# Patient Record
Sex: FEMALE | Race: WHITE | Employment: FULL TIME | URBAN - METROPOLITAN AREA
[De-identification: names, ages, dates, MRNs, and addresses within clinical notes are randomized per-mention and may not be internally consistent; named-entity substitution may affect disease eponyms.]

---

## 2018-02-19 ENCOUNTER — TELEPHONE (OUTPATIENT)
Dept: OBGYN CLINIC | Facility: CLINIC | Age: 33
End: 2018-02-19

## 2018-02-19 DIAGNOSIS — O36.80X0 ENCOUNTER TO DETERMINE FETAL VIABILITY OF PREGNANCY, SINGLE OR UNSPECIFIED FETUS: Primary | ICD-10-CM

## 2018-02-19 NOTE — TELEPHONE ENCOUNTER
----- Message from Oma Adams sent at 2/19/2018 11:39 AM CST -----  Regarding: US order   Needs order for FOB US  On March 21.

## 2018-02-20 ENCOUNTER — TELEPHONE (OUTPATIENT)
Dept: OBGYN CLINIC | Facility: CLINIC | Age: 33
End: 2018-02-20

## 2018-02-20 NOTE — TELEPHONE ENCOUNTER
Spoke with pt. Advised okay to have eye dilated if necessary, fluroscein is category C, but limited exposure with only 1 drop per eye. Pt had severe case of pink eye and was on opthalmic antibiotic eye drops and steroid drops for 2-3 weeks.  Currently 5w0d

## 2018-02-20 NOTE — TELEPHONE ENCOUNTER
Patient is seeing her eye doctor today and wants to know if she can get her eyes dilated - she recently found out she's pregnant. FOB appt:   Future Appointments  Date Time Provider Gisell Dao   3/21/2018 10:15 AM [facility] US [facility] O [facility]

## 2018-03-21 ENCOUNTER — APPOINTMENT (OUTPATIENT)
Dept: OBGYN CLINIC | Facility: CLINIC | Age: 33
End: 2018-03-21

## 2018-03-21 ENCOUNTER — OFFICE VISIT (OUTPATIENT)
Dept: OBGYN CLINIC | Facility: CLINIC | Age: 33
End: 2018-03-21

## 2018-03-21 VITALS
HEIGHT: 63 IN | SYSTOLIC BLOOD PRESSURE: 90 MMHG | WEIGHT: 133 LBS | BODY MASS INDEX: 23.57 KG/M2 | DIASTOLIC BLOOD PRESSURE: 60 MMHG | HEART RATE: 80 BPM

## 2018-03-21 DIAGNOSIS — Z34.81 PRENATAL CARE, SUBSEQUENT PREGNANCY IN FIRST TRIMESTER: Primary | ICD-10-CM

## 2018-03-21 DIAGNOSIS — O36.80X0 ENCOUNTER TO DETERMINE FETAL VIABILITY OF PREGNANCY, SINGLE OR UNSPECIFIED FETUS: ICD-10-CM

## 2018-03-21 DIAGNOSIS — Z36.9 ENCOUNTER FOR ANTENATAL SCREENING OF MOTHER: ICD-10-CM

## 2018-03-21 LAB
APPEARANCE: CLEAR
MULTISTIX LOT#: NORMAL NUMERIC
PH, URINE: 7 (ref 4.5–8)
SPECIFIC GRAVITY: 1.01 (ref 1–1.03)
URINE-COLOR: YELLOW
UROBILINOGEN,SEMI-QN: 0.2 MG/DL (ref 0–1.9)

## 2018-03-21 PROCEDURE — 87491 CHLMYD TRACH DNA AMP PROBE: CPT | Performed by: OBSTETRICS & GYNECOLOGY

## 2018-03-21 PROCEDURE — 76801 OB US < 14 WKS SINGLE FETUS: CPT | Performed by: OBSTETRICS & GYNECOLOGY

## 2018-03-21 PROCEDURE — 87086 URINE CULTURE/COLONY COUNT: CPT | Performed by: OBSTETRICS & GYNECOLOGY

## 2018-03-21 PROCEDURE — 87591 N.GONORRHOEAE DNA AMP PROB: CPT | Performed by: OBSTETRICS & GYNECOLOGY

## 2018-03-21 PROCEDURE — 81002 URINALYSIS NONAUTO W/O SCOPE: CPT | Performed by: OBSTETRICS & GYNECOLOGY

## 2018-03-21 RX ORDER — GLYCERIN/MINERAL OIL
LOTION (ML) TOPICAL
COMMUNITY

## 2018-03-21 NOTE — PROGRESS NOTES
Patient had ultrasound done on 3/21/2018. Gestational age of 10 weeks 4 days. Due date of 2018. By her last menstrual period CHRISTIANO of 2018. Patient declines any genetic testing. She wishes to have a repeat .   Her previous

## 2018-03-22 ENCOUNTER — TELEPHONE (OUTPATIENT)
Dept: OBGYN CLINIC | Facility: CLINIC | Age: 33
End: 2018-03-22

## 2018-03-22 LAB
C TRACH DNA SPEC QL NAA+PROBE: NEGATIVE
N GONORRHOEA DNA SPEC QL NAA+PROBE: NEGATIVE

## 2018-03-22 NOTE — TELEPHONE ENCOUNTER
Questions answered regarding normalcy of pt's ultrasound. Pt was worried because she could not see \"entire baby\" on the ultrasound. Reassured of how small a fetus is at 9 weeks, but everything looks normal and growth is appropriate.

## 2018-04-10 ENCOUNTER — LAB ENCOUNTER (OUTPATIENT)
Dept: LAB | Age: 33
End: 2018-04-10
Attending: OBSTETRICS & GYNECOLOGY
Payer: COMMERCIAL

## 2018-04-10 DIAGNOSIS — Z36.9 ENCOUNTER FOR ANTENATAL SCREENING OF MOTHER: ICD-10-CM

## 2018-04-10 PROCEDURE — 86900 BLOOD TYPING SEROLOGIC ABO: CPT

## 2018-04-10 PROCEDURE — 86901 BLOOD TYPING SEROLOGIC RH(D): CPT

## 2018-04-10 PROCEDURE — 36415 COLL VENOUS BLD VENIPUNCTURE: CPT

## 2018-04-10 PROCEDURE — 87389 HIV-1 AG W/HIV-1&-2 AB AG IA: CPT

## 2018-04-10 PROCEDURE — 86762 RUBELLA ANTIBODY: CPT

## 2018-04-10 PROCEDURE — 87340 HEPATITIS B SURFACE AG IA: CPT

## 2018-04-10 PROCEDURE — 86850 RBC ANTIBODY SCREEN: CPT

## 2018-04-10 PROCEDURE — 85025 COMPLETE CBC W/AUTO DIFF WBC: CPT

## 2018-04-10 PROCEDURE — 86780 TREPONEMA PALLIDUM: CPT

## 2018-04-18 ENCOUNTER — OFFICE VISIT (OUTPATIENT)
Dept: OBGYN CLINIC | Facility: CLINIC | Age: 33
End: 2018-04-18

## 2018-04-18 VITALS
BODY MASS INDEX: 24.63 KG/M2 | DIASTOLIC BLOOD PRESSURE: 62 MMHG | HEIGHT: 63 IN | SYSTOLIC BLOOD PRESSURE: 100 MMHG | WEIGHT: 139 LBS | HEART RATE: 76 BPM

## 2018-04-18 DIAGNOSIS — Z34.82 PRENATAL CARE, SUBSEQUENT PREGNANCY IN SECOND TRIMESTER: Primary | ICD-10-CM

## 2018-05-21 ENCOUNTER — OFFICE VISIT (OUTPATIENT)
Dept: OBGYN CLINIC | Facility: CLINIC | Age: 33
End: 2018-05-21

## 2018-05-21 VITALS
HEIGHT: 63 IN | WEIGHT: 144 LBS | BODY MASS INDEX: 25.52 KG/M2 | HEART RATE: 76 BPM | DIASTOLIC BLOOD PRESSURE: 60 MMHG | SYSTOLIC BLOOD PRESSURE: 104 MMHG

## 2018-05-21 DIAGNOSIS — Z34.82 PRENATAL CARE, SUBSEQUENT PREGNANCY IN SECOND TRIMESTER: Primary | ICD-10-CM

## 2018-05-21 DIAGNOSIS — Z36.89 ENCOUNTER FOR FETAL ANATOMIC SURVEY: ICD-10-CM

## 2018-05-21 NOTE — PROGRESS NOTES
IKER NUNEZ felt almost daily now. Doing well. Discussed use of sunscreen, bug spray with DEET and tick prevention while pregnancy this summer. PT vu.   No complaints. No LOF/VB/uctx  Genetic testing declined at previous visits and today as well.    Anatomy S

## 2018-06-27 ENCOUNTER — ROUTINE PRENATAL (OUTPATIENT)
Dept: OBGYN CLINIC | Facility: CLINIC | Age: 33
End: 2018-06-27

## 2018-06-27 VITALS
SYSTOLIC BLOOD PRESSURE: 114 MMHG | BODY MASS INDEX: 26.75 KG/M2 | HEART RATE: 70 BPM | HEIGHT: 63 IN | WEIGHT: 151 LBS | DIASTOLIC BLOOD PRESSURE: 68 MMHG

## 2018-06-27 DIAGNOSIS — Z34.02 ENCOUNTER FOR SUPERVISION OF NORMAL FIRST PREGNANCY IN SECOND TRIMESTER: ICD-10-CM

## 2018-06-27 DIAGNOSIS — Z36.9 ANTENATAL SCREENING ENCOUNTER: Primary | ICD-10-CM

## 2018-06-27 NOTE — PROGRESS NOTES
Doing well. Fetus active. Going to [facility].   Aware to use insect repellent 1 hour Glucola return to office in 4 weeks

## 2018-07-09 ENCOUNTER — TELEPHONE (OUTPATIENT)
Dept: OBGYN CLINIC | Facility: CLINIC | Age: 33
End: 2018-07-09

## 2018-07-09 NOTE — TELEPHONE ENCOUNTER
PC with patient. States has nasal stuffiness, a little drainage  and some post nasal drip that irritates her throat. Try saline nasal spray, warm steam and salt water gargles for throat. She can try 1 plain sudafed to help loosen if needed.  Tylenol for pain,

## 2018-07-19 ENCOUNTER — APPOINTMENT (OUTPATIENT)
Dept: LAB | Age: 33
End: 2018-07-19
Attending: NURSE PRACTITIONER
Payer: COMMERCIAL

## 2018-07-19 LAB
BAND %: 6 %
BASOPHIL % MANUAL: 0 %
BASOPHIL ABSOLUTE MANUAL: 0 X10(3) UL (ref 0–0.1)
EOSINOPHIL % MANUAL: 1 %
EOSINOPHIL ABSOLUTE MANUAL: 0.1 X10(3) UL (ref 0–0.3)
ERYTHROCYTE [DISTWIDTH] IN BLOOD BY AUTOMATED COUNT: 14.6 % (ref 11.5–16)
GLUCOSE 1H P GLC SERPL-MCNC: 123 MG/DL
HCT VFR BLD AUTO: 36.6 % (ref 34–50)
HGB BLD-MCNC: 11.7 G/DL (ref 12–16)
LYMPHOCYTE % MANUAL: 3 %
LYMPHOCYTE ABSOLUTE MANUAL: 0.29 X10(3) UL (ref 0.9–4)
MCH RBC QN AUTO: 29.8 PG (ref 27–33.2)
MCHC RBC AUTO-ENTMCNC: 32 G/DL (ref 31–37)
MCV RBC AUTO: 93.1 FL (ref 81–100)
METAMYELOCYTE %: 3 %
METAMYELOCYTE ABSOLUTE MANUAL: 0.29 X10(3) UL (ref ?–0.01)
MONOCYTE % MANUAL: 12 %
MONOCYTE ABSOLUTE MANUAL: 1.14 X10(3) UL (ref 0.1–1)
NEUTROPHIL ABS PRELIM: 7.01 X10 (3) UL (ref 1.3–6.7)
NEUTROPHIL ABSOLUTE MANUAL: 7.7 X10(3) UL (ref 1.3–6.7)
NEUTROPHILS % MANUAL: 75 %
PLATELET # BLD AUTO: 211 10(3)UL (ref 150–450)
PLATELET MORPHOLOGY: NORMAL
RBC # BLD AUTO: 3.93 X10(6)UL (ref 3.8–5.1)
RED CELL DISTRIBUTION WIDTH-SD: 49.5 FL (ref 35.1–46.3)
TOTAL CELLS COUNTED: 100
WBC # BLD AUTO: 9.5 X10(3) UL (ref 4–13)

## 2018-07-19 PROCEDURE — 85027 COMPLETE CBC AUTOMATED: CPT | Performed by: OBSTETRICS & GYNECOLOGY

## 2018-07-19 PROCEDURE — 85007 BL SMEAR W/DIFF WBC COUNT: CPT | Performed by: OBSTETRICS & GYNECOLOGY

## 2018-07-19 PROCEDURE — 36415 COLL VENOUS BLD VENIPUNCTURE: CPT | Performed by: OBSTETRICS & GYNECOLOGY

## 2018-07-19 PROCEDURE — 82950 GLUCOSE TEST: CPT | Performed by: OBSTETRICS & GYNECOLOGY

## 2018-07-19 PROCEDURE — 85025 COMPLETE CBC W/AUTO DIFF WBC: CPT | Performed by: OBSTETRICS & GYNECOLOGY

## 2018-07-25 ENCOUNTER — ROUTINE PRENATAL (OUTPATIENT)
Dept: OBGYN CLINIC | Facility: CLINIC | Age: 33
End: 2018-07-25
Payer: COMMERCIAL

## 2018-07-25 ENCOUNTER — HOSPITAL ENCOUNTER (OUTPATIENT)
Facility: HOSPITAL | Age: 33
Setting detail: OBSERVATION
Discharge: HOME | End: 2018-07-25
Attending: OBSTETRICS & GYNECOLOGY | Admitting: OBSTETRICS & GYNECOLOGY
Payer: COMMERCIAL

## 2018-07-25 ENCOUNTER — APPOINTMENT (OUTPATIENT)
Dept: ULTRASOUND IMAGING | Facility: HOSPITAL | Age: 33
End: 2018-07-25
Attending: OBSTETRICS & GYNECOLOGY
Payer: COMMERCIAL

## 2018-07-25 VITALS
HEART RATE: 80 BPM | RESPIRATION RATE: 22 BRPM | TEMPERATURE: 98 F | HEIGHT: 62.99 IN | SYSTOLIC BLOOD PRESSURE: 123 MMHG | WEIGHT: 157 LBS | DIASTOLIC BLOOD PRESSURE: 69 MMHG | BODY MASS INDEX: 27.82 KG/M2

## 2018-07-25 VITALS
SYSTOLIC BLOOD PRESSURE: 104 MMHG | HEART RATE: 64 BPM | HEIGHT: 63 IN | BODY MASS INDEX: 27.82 KG/M2 | WEIGHT: 157 LBS | DIASTOLIC BLOOD PRESSURE: 68 MMHG

## 2018-07-25 DIAGNOSIS — Z23 ENCOUNTER FOR IMMUNIZATION: ICD-10-CM

## 2018-07-25 DIAGNOSIS — O36.8390 ABNORMALITY IN FETAL HEART RATE OR RHYTHM, ANTEPARTUM: ICD-10-CM

## 2018-07-25 DIAGNOSIS — Z34.02 ENCOUNTER FOR SUPERVISION OF NORMAL FIRST PREGNANCY IN SECOND TRIMESTER: Primary | ICD-10-CM

## 2018-07-25 DIAGNOSIS — Z34.82 PRENATAL CARE, SUBSEQUENT PREGNANCY IN SECOND TRIMESTER: ICD-10-CM

## 2018-07-25 PROBLEM — Z34.90 PREGNANCY: Status: ACTIVE | Noted: 2018-07-25

## 2018-07-25 LAB — FETAL FIBRINECTIN: NEGATIVE

## 2018-07-25 PROCEDURE — 59025 FETAL NON-STRESS TEST: CPT | Performed by: OBSTETRICS & GYNECOLOGY

## 2018-07-25 PROCEDURE — 90471 IMMUNIZATION ADMIN: CPT | Performed by: NURSE PRACTITIONER

## 2018-07-25 PROCEDURE — 90715 TDAP VACCINE 7 YRS/> IM: CPT | Performed by: NURSE PRACTITIONER

## 2018-07-25 PROCEDURE — 76815 OB US LIMITED FETUS(S): CPT | Performed by: OBSTETRICS & GYNECOLOGY

## 2018-07-25 PROCEDURE — 99219 INITIAL OBSERVATION CARE,LEVL II: CPT | Performed by: OBSTETRICS & GYNECOLOGY

## 2018-07-25 RX ORDER — TERBUTALINE SULFATE 1 MG/ML
0.25 INJECTION, SOLUTION SUBCUTANEOUS ONCE
Status: COMPLETED | OUTPATIENT
Start: 2018-07-25 | End: 2018-07-25

## 2018-07-25 NOTE — PROGRESS NOTES
IKER  Doing well  FM GOOD, denies VB/LOF/uctx  1 hr glucose= PASSED  TDAP recommended during pregnancy and instructions given. Pt received immunization today. FHTs audibly varying between 140s & 60-70s. Placed on EFM/NST to obtain FHR tracing.  Baseline kriss

## 2018-07-25 NOTE — PROGRESS NOTES
Pt is a 27year old female admitted to TRG2/TRG2-A, Patient presents with:  Non-stress Test: Extended monitoring     Pt is 27w1d intra-uterine pregnancy. Denies any leaking of fluid. Reports +fetal movement. History obtained, consents signed.  Oriented to

## 2018-07-26 NOTE — NST
Nonstress Test   Patient: Newt Gain    Gestation: 27w1d    NST:       Variability: Moderate           Accelerations: Yes           Decelerations: None            Baseline: 145 BPM           Uterine Irritability: No           Contractions: Not pre

## 2018-08-09 ENCOUNTER — ROUTINE PRENATAL (OUTPATIENT)
Dept: OBGYN CLINIC | Facility: CLINIC | Age: 33
End: 2018-08-09
Payer: COMMERCIAL

## 2018-08-09 VITALS
HEIGHT: 63 IN | DIASTOLIC BLOOD PRESSURE: 68 MMHG | HEART RATE: 68 BPM | SYSTOLIC BLOOD PRESSURE: 116 MMHG | BODY MASS INDEX: 28.53 KG/M2 | WEIGHT: 161 LBS

## 2018-08-09 DIAGNOSIS — Z36.9 ANTENATAL SCREENING ENCOUNTER: ICD-10-CM

## 2018-08-09 DIAGNOSIS — Z36.89 ENCOUNTER FOR ULTRASOUND TO ASSESS INTERVAL GROWTH OF FETUS: ICD-10-CM

## 2018-08-09 DIAGNOSIS — Z34.03 ENCOUNTER FOR SUPERVISION OF NORMAL FIRST PREGNANCY IN THIRD TRIMESTER: Primary | ICD-10-CM

## 2018-08-09 NOTE — PROGRESS NOTES
IKER  Doing well, GOOD FM. Denies VB/LOF/uctx. Pt has stopped consuming caffeine. Reassured of \"normal\" FHTs today. No auditory signs of fetal arrhythmia at this time. Pt understands she is measuring 2 weeks \"ahead\". Will get growth US next week.   RTC

## 2018-08-21 ENCOUNTER — PATIENT MESSAGE (OUTPATIENT)
Dept: OBGYN CLINIC | Facility: CLINIC | Age: 33
End: 2018-08-21

## 2018-08-21 ENCOUNTER — APPOINTMENT (OUTPATIENT)
Dept: LAB | Age: 33
End: 2018-08-21
Attending: NURSE PRACTITIONER
Payer: COMMERCIAL

## 2018-08-21 LAB
BAND %: 2 %
BASOPHIL % MANUAL: 0 %
BASOPHIL ABSOLUTE MANUAL: 0 X10(3) UL (ref 0–0.1)
EOSINOPHIL % MANUAL: 1 %
EOSINOPHIL ABSOLUTE MANUAL: 0.12 X10(3) UL (ref 0–0.3)
ERYTHROCYTE [DISTWIDTH] IN BLOOD BY AUTOMATED COUNT: 14.8 % (ref 11.5–16)
HCT VFR BLD AUTO: 35.8 % (ref 34–50)
HGB BLD-MCNC: 11.7 G/DL (ref 12–16)
LYMPHOCYTE % MANUAL: 12 %
LYMPHOCYTE ABSOLUTE MANUAL: 1.49 X10(3) UL (ref 0.9–4)
MCH RBC QN AUTO: 29.7 PG (ref 27–33.2)
MCHC RBC AUTO-ENTMCNC: 32.7 G/DL (ref 31–37)
MCV RBC AUTO: 90.9 FL (ref 81–100)
METAMYELOCYTE %: 1 %
METAMYELOCYTE ABSOLUTE MANUAL: 0.12 X10(3) UL (ref ?–0.01)
MONOCYTE % MANUAL: 2 %
MONOCYTE ABSOLUTE MANUAL: 0.25 X10(3) UL (ref 0.1–1)
MORPHOLOGY: NORMAL
NEUTROPHIL ABS PRELIM: 9.16 X10 (3) UL (ref 1.3–6.7)
NEUTROPHIL ABSOLUTE MANUAL: 10.42 X10(3) UL (ref 1.3–6.7)
NEUTROPHILS % MANUAL: 82 %
PLATELET # BLD AUTO: 229 10(3)UL (ref 150–450)
PLATELET MORPHOLOGY: NORMAL
RBC # BLD AUTO: 3.94 X10(6)UL (ref 3.8–5.1)
RED CELL DISTRIBUTION WIDTH-SD: 49.1 FL (ref 35.1–46.3)
TOTAL CELLS COUNTED: 100
WBC # BLD AUTO: 12.4 X10(3) UL (ref 4–13)

## 2018-08-21 PROCEDURE — 85007 BL SMEAR W/DIFF WBC COUNT: CPT | Performed by: NURSE PRACTITIONER

## 2018-08-21 PROCEDURE — 87389 HIV-1 AG W/HIV-1&-2 AB AG IA: CPT | Performed by: NURSE PRACTITIONER

## 2018-08-21 PROCEDURE — 85025 COMPLETE CBC W/AUTO DIFF WBC: CPT | Performed by: NURSE PRACTITIONER

## 2018-08-21 PROCEDURE — 85027 COMPLETE CBC AUTOMATED: CPT | Performed by: NURSE PRACTITIONER

## 2018-08-21 PROCEDURE — 36415 COLL VENOUS BLD VENIPUNCTURE: CPT | Performed by: NURSE PRACTITIONER

## 2018-08-21 NOTE — TELEPHONE ENCOUNTER
From: Italia Levine  To: CHRISTIAN Varela  Sent: 8/21/2018 8:14 AM CDT  Subject: Non-Urgent Medical Question    Hi,    I went today to get my blood work done.  They umm blood for the HIV test and TBC test but they didn't show that I was supposed

## 2018-08-27 ENCOUNTER — ROUTINE PRENATAL (OUTPATIENT)
Dept: OBGYN CLINIC | Facility: CLINIC | Age: 33
End: 2018-08-27
Payer: COMMERCIAL

## 2018-08-27 ENCOUNTER — TELEPHONE (OUTPATIENT)
Dept: OBGYN CLINIC | Facility: CLINIC | Age: 33
End: 2018-08-27

## 2018-08-27 VITALS
DIASTOLIC BLOOD PRESSURE: 70 MMHG | HEIGHT: 63 IN | HEART RATE: 68 BPM | SYSTOLIC BLOOD PRESSURE: 112 MMHG | BODY MASS INDEX: 28.88 KG/M2 | WEIGHT: 163 LBS

## 2018-08-27 DIAGNOSIS — Z34.03 ENCOUNTER FOR SUPERVISION OF NORMAL FIRST PREGNANCY IN THIRD TRIMESTER: Primary | ICD-10-CM

## 2018-08-27 NOTE — PROGRESS NOTES
IKER. (US was cancelled for today)  Doing well, GOOD FM.  Denies VB/LOF/uctx  RTC in 9 days for IKER and growth US  Fetal movement/kick counts instructions given

## 2018-09-05 ENCOUNTER — ROUTINE PRENATAL (OUTPATIENT)
Dept: OBGYN CLINIC | Facility: CLINIC | Age: 33
End: 2018-09-05
Payer: COMMERCIAL

## 2018-09-05 ENCOUNTER — APPOINTMENT (OUTPATIENT)
Dept: OBGYN CLINIC | Facility: CLINIC | Age: 33
End: 2018-09-05
Payer: COMMERCIAL

## 2018-09-05 VITALS
BODY MASS INDEX: 29.06 KG/M2 | SYSTOLIC BLOOD PRESSURE: 108 MMHG | DIASTOLIC BLOOD PRESSURE: 70 MMHG | HEIGHT: 63 IN | HEART RATE: 76 BPM | WEIGHT: 164 LBS

## 2018-09-05 DIAGNOSIS — Z34.83 ENCOUNTER FOR SUPERVISION OF OTHER NORMAL PREGNANCY IN THIRD TRIMESTER: Primary | ICD-10-CM

## 2018-09-05 DIAGNOSIS — Z36.89 ENCOUNTER FOR ULTRASOUND TO ASSESS FETAL GROWTH: ICD-10-CM

## 2018-09-05 DIAGNOSIS — O40.9XX0 POLYHYDRAMNIOS, ANTEPARTUM, SINGLE OR UNSPECIFIED FETUS: ICD-10-CM

## 2018-09-05 NOTE — PROGRESS NOTES
Doing well, GOOD FM. Denies VB/LOF/uctx  Pt had growth US today and found to have polyhydramnios at 26.65 cm. S=D (EFW 2322 gm). Discussed with pt will have her follow up with MFM in 1-2 weeks to have Level II US done and consult.  Phone number for their of

## 2018-09-13 ENCOUNTER — TELEPHONE (OUTPATIENT)
Dept: OBGYN CLINIC | Facility: CLINIC | Age: 33
End: 2018-09-13

## 2018-09-13 NOTE — TELEPHONE ENCOUNTER
[facility]     no authorization required if its a 5 day stay, must call if a longer stay is required   Ref# [id number]

## 2018-09-18 NOTE — PROGRESS NOTES
Outpatient Maternal-Fetal Medicine Consultation    Dear Dr. Dmitriy Bautista,    Thank you for requesting ultrasound evaluation and maternal fetal medicine consultation on your patient Chayo Mary.   As you are aware she is a 26year old female with a [place] patient had a level II ultrasound today which I interpreted the results and reviewed them with the patient. Indication: poly. Maternal age (26 years).    ____________________________________________________________________________  History: Age: 26 years The fetal anatomy survey appears normal and fetal growth is appropriate. Polyhydramnios    ____________________________________________________________________________  Fetal Wellbeing Assessment:  Amniotic fluid: polyhydramnios. BOZENA: 29.4 cm.  MVP: 11.9 cm surveillance is advised for these patients including a comprehensive ultrasound to assess for fetal malformations (at 20 weeks) and a third trimester ultrasound assessment for fetal growth (at 32 weeks).  In addition, weekly NST's (initiating at 36 weeks ge testing in older women does increase the chance that a women will be induced (71 inductions per fetal death averted) and thereby increases her risk of having a  delivery, only 14 additional cesareans would need to be performed to avert one unexplai cm  · Advanced maternal age, declined genetic testing or screening previously  · H/o     RECOMMENDATIONS:  · Continue care with Dr. Fritz Vasquez  · Weekly NST with BOZENA until delivery  · Delivery is recommended at 38-39 weeks for polyhydramnios    Thank yo

## 2018-09-19 ENCOUNTER — ROUTINE PRENATAL (OUTPATIENT)
Dept: OBGYN CLINIC | Facility: CLINIC | Age: 33
End: 2018-09-19
Payer: COMMERCIAL

## 2018-09-19 ENCOUNTER — OFFICE VISIT (OUTPATIENT)
Dept: PERINATAL CARE | Facility: HOSPITAL | Age: 33
End: 2018-09-19
Attending: NURSE PRACTITIONER
Payer: COMMERCIAL

## 2018-09-19 VITALS
SYSTOLIC BLOOD PRESSURE: 104 MMHG | DIASTOLIC BLOOD PRESSURE: 68 MMHG | HEART RATE: 84 BPM | WEIGHT: 165 LBS | HEIGHT: 63 IN | BODY MASS INDEX: 29.23 KG/M2

## 2018-09-19 VITALS
DIASTOLIC BLOOD PRESSURE: 69 MMHG | SYSTOLIC BLOOD PRESSURE: 128 MMHG | HEART RATE: 96 BPM | HEIGHT: 63 IN | WEIGHT: 166 LBS | RESPIRATION RATE: 20 BRPM | BODY MASS INDEX: 29.41 KG/M2

## 2018-09-19 DIAGNOSIS — O40.9XX0 POLYHYDRAMNIOS, ANTEPARTUM, SINGLE OR UNSPECIFIED FETUS: ICD-10-CM

## 2018-09-19 DIAGNOSIS — Z36.85 ANTENATAL SCREENING FOR STREPTOCOCCUS B: ICD-10-CM

## 2018-09-19 DIAGNOSIS — O09.523 ELDERLY MULTIGRAVIDA IN THIRD TRIMESTER: ICD-10-CM

## 2018-09-19 DIAGNOSIS — Z34.83 ENCOUNTER FOR SUPERVISION OF OTHER NORMAL PREGNANCY IN THIRD TRIMESTER: Primary | ICD-10-CM

## 2018-09-19 PROCEDURE — 87081 CULTURE SCREEN ONLY: CPT | Performed by: NURSE PRACTITIONER

## 2018-09-19 PROCEDURE — 76820 UMBILICAL ARTERY ECHO: CPT | Performed by: OBSTETRICS & GYNECOLOGY

## 2018-09-19 PROCEDURE — 76819 FETAL BIOPHYS PROFIL W/O NST: CPT | Performed by: OBSTETRICS & GYNECOLOGY

## 2018-09-19 PROCEDURE — 87653 STREP B DNA AMP PROBE: CPT | Performed by: NURSE PRACTITIONER

## 2018-09-19 PROCEDURE — 99243 OFF/OP CNSLTJ NEW/EST LOW 30: CPT | Performed by: OBSTETRICS & GYNECOLOGY

## 2018-09-19 PROCEDURE — 76811 OB US DETAILED SNGL FETUS: CPT | Performed by: OBSTETRICS & GYNECOLOGY

## 2018-09-19 NOTE — PROGRESS NOTES
IKER  Doing well, +FM. Denies VB/LOF/uctx. C/s scheduled on 10-13-18 at 39 weeks gestation  Labor precautions discussed  GBS collected  RTC 1 week  Seeing MFM right after this appointment. They will be doing a level 2 US and consult on her polyhydramnios.

## 2018-09-24 ENCOUNTER — TELEPHONE (OUTPATIENT)
Dept: OBGYN CLINIC | Facility: CLINIC | Age: 33
End: 2018-09-24

## 2018-09-27 ENCOUNTER — APPOINTMENT (OUTPATIENT)
Dept: ULTRASOUND IMAGING | Facility: HOSPITAL | Age: 33
End: 2018-09-27
Attending: OBSTETRICS & GYNECOLOGY
Payer: COMMERCIAL

## 2018-09-27 ENCOUNTER — ANESTHESIA EVENT (OUTPATIENT)
Dept: OBGYN UNIT | Facility: HOSPITAL | Age: 33
End: 2018-09-27
Payer: COMMERCIAL

## 2018-09-27 ENCOUNTER — HOSPITAL ENCOUNTER (INPATIENT)
Facility: HOSPITAL | Age: 33
LOS: 3 days | Discharge: HOME | End: 2018-09-30
Attending: OBSTETRICS & GYNECOLOGY | Admitting: OBSTETRICS & GYNECOLOGY
Payer: COMMERCIAL

## 2018-09-27 ENCOUNTER — ANESTHESIA (OUTPATIENT)
Dept: OBGYN UNIT | Facility: HOSPITAL | Age: 33
End: 2018-09-27
Payer: COMMERCIAL

## 2018-09-27 ENCOUNTER — ROUTINE PRENATAL (OUTPATIENT)
Dept: OBGYN CLINIC | Facility: CLINIC | Age: 33
End: 2018-09-27
Payer: COMMERCIAL

## 2018-09-27 VITALS
WEIGHT: 166 LBS | DIASTOLIC BLOOD PRESSURE: 70 MMHG | BODY MASS INDEX: 29.41 KG/M2 | HEIGHT: 63 IN | HEART RATE: 88 BPM | SYSTOLIC BLOOD PRESSURE: 112 MMHG

## 2018-09-27 DIAGNOSIS — O09.523 ADVANCED MATERNAL AGE IN MULTIGRAVIDA, THIRD TRIMESTER: ICD-10-CM

## 2018-09-27 DIAGNOSIS — O40.9XX0 POLYHYDRAMNIOS, ANTEPARTUM, SINGLE OR UNSPECIFIED FETUS: ICD-10-CM

## 2018-09-27 DIAGNOSIS — Z36.9 ANTENATAL SCREENING ENCOUNTER: ICD-10-CM

## 2018-09-27 DIAGNOSIS — Z34.83 ENCOUNTER FOR SUPERVISION OF OTHER NORMAL PREGNANCY IN THIRD TRIMESTER: Primary | ICD-10-CM

## 2018-09-27 PROCEDURE — 59510 CESAREAN DELIVERY: CPT | Performed by: OBSTETRICS & GYNECOLOGY

## 2018-09-27 PROCEDURE — 76815 OB US LIMITED FETUS(S): CPT | Performed by: OBSTETRICS & GYNECOLOGY

## 2018-09-27 PROCEDURE — 59025 FETAL NON-STRESS TEST: CPT | Performed by: NURSE PRACTITIONER

## 2018-09-27 RX ORDER — HYDROCODONE BITARTRATE AND ACETAMINOPHEN 5; 325 MG/1; MG/1
1 TABLET ORAL EVERY 4 HOURS PRN
Status: DISCONTINUED | OUTPATIENT
Start: 2018-09-27 | End: 2018-09-30

## 2018-09-27 RX ORDER — ONDANSETRON 2 MG/ML
4 INJECTION INTRAMUSCULAR; INTRAVENOUS EVERY 6 HOURS PRN
Status: DISCONTINUED | OUTPATIENT
Start: 2018-09-27 | End: 2018-09-30

## 2018-09-27 RX ORDER — BETAMETHASONE SODIUM PHOSPHATE AND BETAMETHASONE ACETATE 3; 3 MG/ML; MG/ML
12 INJECTION, SUSPENSION INTRA-ARTICULAR; INTRALESIONAL; INTRAMUSCULAR; SOFT TISSUE EVERY 24 HOURS
Status: DISCONTINUED | OUTPATIENT
Start: 2018-09-27 | End: 2018-09-27

## 2018-09-27 RX ORDER — BISACODYL 10 MG
10 SUPPOSITORY, RECTAL RECTAL
Status: DISCONTINUED | OUTPATIENT
Start: 2018-09-27 | End: 2018-09-30

## 2018-09-27 RX ORDER — DOCUSATE SODIUM 100 MG/1
100 CAPSULE, LIQUID FILLED ORAL
Status: DISCONTINUED | OUTPATIENT
Start: 2018-09-28 | End: 2018-09-30

## 2018-09-27 RX ORDER — NALBUPHINE HCL 10 MG/ML
2.5 AMPUL (ML) INJECTION
Status: DISCONTINUED | OUTPATIENT
Start: 2018-09-27 | End: 2018-09-30

## 2018-09-27 RX ORDER — DIPHENHYDRAMINE HCL 25 MG
25 CAPSULE ORAL EVERY 4 HOURS PRN
Status: DISCONTINUED | OUTPATIENT
Start: 2018-09-27 | End: 2018-09-30

## 2018-09-27 RX ORDER — KETOROLAC TROMETHAMINE 30 MG/ML
30 INJECTION, SOLUTION INTRAMUSCULAR; INTRAVENOUS ONCE AS NEEDED
Status: ACTIVE | OUTPATIENT
Start: 2018-09-27 | End: 2018-09-27

## 2018-09-27 RX ORDER — SODIUM CHLORIDE, SODIUM LACTATE, POTASSIUM CHLORIDE, CALCIUM CHLORIDE 600; 310; 30; 20 MG/100ML; MG/100ML; MG/100ML; MG/100ML
INJECTION, SOLUTION INTRAVENOUS CONTINUOUS
Status: DISCONTINUED | OUTPATIENT
Start: 2018-09-27 | End: 2018-09-30

## 2018-09-27 RX ORDER — MORPHINE SULFATE 0.5 MG/ML
0.2 INJECTION, SOLUTION EPIDURAL; INTRATHECAL; INTRAVENOUS ONCE
Status: DISCONTINUED | OUTPATIENT
Start: 2018-09-27 | End: 2018-09-27

## 2018-09-27 RX ORDER — KETOROLAC TROMETHAMINE 30 MG/ML
30 INJECTION, SOLUTION INTRAMUSCULAR; INTRAVENOUS EVERY 6 HOURS PRN
Status: DISPENSED | OUTPATIENT
Start: 2018-09-27 | End: 2018-09-27

## 2018-09-27 RX ORDER — NALOXONE HYDROCHLORIDE 0.4 MG/ML
0.08 INJECTION, SOLUTION INTRAMUSCULAR; INTRAVENOUS; SUBCUTANEOUS
Status: ACTIVE | OUTPATIENT
Start: 2018-09-27 | End: 2018-09-28

## 2018-09-27 RX ORDER — NALBUPHINE HCL 10 MG/ML
2.5 AMPUL (ML) INJECTION EVERY 4 HOURS PRN
Status: DISCONTINUED | OUTPATIENT
Start: 2018-09-27 | End: 2018-09-30

## 2018-09-27 RX ORDER — HYDROCODONE BITARTRATE AND ACETAMINOPHEN 10; 325 MG/1; MG/1
1 TABLET ORAL EVERY 4 HOURS PRN
Status: DISCONTINUED | OUTPATIENT
Start: 2018-09-27 | End: 2018-09-30

## 2018-09-27 RX ORDER — TRISODIUM CITRATE DIHYDRATE AND CITRIC ACID MONOHYDRATE 500; 334 MG/5ML; MG/5ML
30 SOLUTION ORAL ONCE
Status: COMPLETED | OUTPATIENT
Start: 2018-09-27 | End: 2018-09-27

## 2018-09-27 RX ORDER — IBUPROFEN 600 MG/1
600 TABLET ORAL EVERY 6 HOURS SCHEDULED
Status: DISCONTINUED | OUTPATIENT
Start: 2018-09-28 | End: 2018-09-30

## 2018-09-27 RX ORDER — CEFAZOLIN SODIUM/WATER 2 G/20 ML
2 SYRINGE (ML) INTRAVENOUS ONCE
Status: DISCONTINUED | OUTPATIENT
Start: 2018-09-27 | End: 2018-09-27

## 2018-09-27 RX ORDER — SIMETHICONE 80 MG
80 TABLET,CHEWABLE ORAL 3 TIMES DAILY PRN
Status: DISCONTINUED | OUTPATIENT
Start: 2018-09-27 | End: 2018-09-30

## 2018-09-27 RX ORDER — DIPHENHYDRAMINE HYDROCHLORIDE 50 MG/ML
25 INJECTION INTRAMUSCULAR; INTRAVENOUS ONCE AS NEEDED
Status: ACTIVE | OUTPATIENT
Start: 2018-09-27 | End: 2018-09-27

## 2018-09-27 RX ORDER — ZOLPIDEM TARTRATE 5 MG/1
5 TABLET ORAL NIGHTLY PRN
Status: DISCONTINUED | OUTPATIENT
Start: 2018-09-27 | End: 2018-09-30

## 2018-09-27 RX ORDER — DIPHENHYDRAMINE HYDROCHLORIDE 50 MG/ML
12.5 INJECTION INTRAMUSCULAR; INTRAVENOUS EVERY 4 HOURS PRN
Status: DISCONTINUED | OUTPATIENT
Start: 2018-09-27 | End: 2018-09-30

## 2018-09-27 RX ORDER — MORPHINE SULFATE 4 MG/ML
2 INJECTION, SOLUTION INTRAMUSCULAR; INTRAVENOUS EVERY 2 HOUR PRN
Status: ACTIVE | OUTPATIENT
Start: 2018-09-27 | End: 2018-09-28

## 2018-09-27 RX ORDER — ONDANSETRON 2 MG/ML
4 INJECTION INTRAMUSCULAR; INTRAVENOUS ONCE AS NEEDED
Status: ACTIVE | OUTPATIENT
Start: 2018-09-27 | End: 2018-09-27

## 2018-09-27 RX ORDER — SODIUM CHLORIDE, SODIUM LACTATE, POTASSIUM CHLORIDE, CALCIUM CHLORIDE 600; 310; 30; 20 MG/100ML; MG/100ML; MG/100ML; MG/100ML
INJECTION, SOLUTION INTRAVENOUS CONTINUOUS
Status: DISCONTINUED | OUTPATIENT
Start: 2018-09-27 | End: 2018-09-27 | Stop reason: HOSPADM

## 2018-09-27 RX ORDER — CEFAZOLIN SODIUM 1 G/3ML
INJECTION, POWDER, FOR SOLUTION INTRAMUSCULAR; INTRAVENOUS
Status: DISCONTINUED | OUTPATIENT
Start: 2018-09-27 | End: 2018-09-27 | Stop reason: HOSPADM

## 2018-09-27 RX ORDER — KETOROLAC TROMETHAMINE 30 MG/ML
30 INJECTION, SOLUTION INTRAMUSCULAR; INTRAVENOUS EVERY 6 HOURS PRN
Status: DISPENSED | OUTPATIENT
Start: 2018-09-27 | End: 2018-09-28

## 2018-09-27 RX ORDER — DEXTROSE, SODIUM CHLORIDE, SODIUM LACTATE, POTASSIUM CHLORIDE, AND CALCIUM CHLORIDE 5; .6; .31; .03; .02 G/100ML; G/100ML; G/100ML; G/100ML; G/100ML
INJECTION, SOLUTION INTRAVENOUS CONTINUOUS
Status: DISCONTINUED | OUTPATIENT
Start: 2018-09-27 | End: 2018-09-30

## 2018-09-27 RX ORDER — MORPHINE SULFATE 4 MG/ML
2 INJECTION, SOLUTION INTRAMUSCULAR; INTRAVENOUS EVERY 5 MIN PRN
Status: ACTIVE | OUTPATIENT
Start: 2018-09-27 | End: 2018-09-28

## 2018-09-27 NOTE — ANESTHESIA POSTPROCEDURE EVALUATION
Jerel FARMER 18. Patient Status:  Inpatient   Age/Gender 26year old female MRN [id number]   Location 18[address] [facility] Attending Radha Luna, [address] [facility] Day # 0 PCP fAsaneh Andersen MD       Anesthesia Post-op Note

## 2018-09-27 NOTE — H&P
Dina 62 A [facility] Patient Status:  Observation    1985 MRN [id number]   Location 1818 [facility] Attending Mary Russell MD   Hosp Day # 0 PCP Chelly Dawson MD     Subjective:   35

## 2018-09-27 NOTE — ANESTHESIA PREPROCEDURE EVALUATION
PRE-OP EVALUATION    Patient Name: Carol Hackett    Pre-op Diagnosis: 39 5/7 week gestation in labor; previous  section; polyhydramnios    Procedure(s):      Surgeon(s) and Role:     * Merle Pike MD - Primary     * Holden Jansen MD 09/27/2018    HGB 14.9 09/27/2018    HCT 45.1 09/27/2018    MCV 86.9 09/27/2018    MCH 28.7 09/27/2018    MCHC 33.0 09/27/2018    RDW 14.7 09/27/2018    .0 09/27/2018               Airway      Mallampati: I    TM distance: > 6 cm  Neck ROM: full Car

## 2018-09-27 NOTE — PROGRESS NOTES
Pt is a 26year old female admitted to TRG3/TRG3-A. Pt is  36w5d intra-uterine pregnancy. Patient presents with:  R/o Labor: Pt states she was sent over from her OB office after a routine appt to r/o labor.  Pt aware of UCs but states she has bee

## 2018-09-27 NOTE — OPERATIVE REPORT
[facility]   Section - Operative Note    Lucio Giron Patient Status:  Inpatient    1985 MRN [id number]   Location 1818 [facility] Attending Joaquin Miranda, [address] [facility] Day # 0 PCP MD Kimber Perdomo The infant was shown to the parents and placed in the radiant warmer. The placenta was delivered intact with a 3 vessel cord. Uterus, tubes and ovaries were normal in appearance. The uterine cavity was swept clean using a wet lap.   The cervix was dilate

## 2018-09-27 NOTE — PROGRESS NOTES
IKER  Doing well, +FM  Denies VB/LOF. Pt reports she has been feeling contractions every 10 \"or so\" minutes. Pt asking if this is normal, as she was under the impression it is normal to ctx this frequently at 36+ wks gestation. ALEXANDRE communicated to pt.  I

## 2018-09-28 NOTE — PROGRESS NOTES
Patient transferred to mother/baby room 1110 per cart in stable condition with baby and personal belongings. Accompanied by her mom and staff. Report given to mother/baby RN.

## 2018-09-28 NOTE — PROGRESS NOTES
[facility]    Patients Name: Oma Briones  Attending Physician: Mark Stanley MD  CSN: [id number]    Location:  1110/1110-A  MRN: [id number]    YOB: 1985  Admission Date: 9/27/2018     Obstetric Anesthesia Pain Progress Note    Post

## 2018-09-28 NOTE — PROGRESS NOTES
POD#1    Pt has no complaints, lochia min   09/28/18  0745   BP: 95/54   Pulse: 67   Resp: 18   Temp: 98.4 °F (36.9 °C)     Recent Labs   Lab  09/27/18   1619  09/28/18   0659   RBC  5.19*  4.08   HGB  14.9  12.0   HCT  45.1  35.8   MCV  86.9  87.7   MCH

## 2018-09-29 NOTE — PROGRESS NOTES
POD#2    Pt has no complaints, lochia min   09/29/18  0800   BP: 90/73   Pulse: 64   Resp: 17   Temp: 97.5 °F (36.4 °C)     Recent Labs   Lab  09/27/18   1619  09/28/18   0659   RBC  5.19*  4.08   HGB  14.9  12.0   HCT  45.1  35.8   MCV  86.9  87.7   MCH

## 2018-09-30 VITALS
SYSTOLIC BLOOD PRESSURE: 114 MMHG | RESPIRATION RATE: 18 BRPM | OXYGEN SATURATION: 100 % | HEIGHT: 63 IN | HEART RATE: 70 BPM | TEMPERATURE: 97 F | DIASTOLIC BLOOD PRESSURE: 69 MMHG | WEIGHT: 166 LBS | BODY MASS INDEX: 29.41 KG/M2

## 2018-09-30 RX ORDER — HYDROCODONE BITARTRATE AND ACETAMINOPHEN 5; 325 MG/1; MG/1
1 TABLET ORAL EVERY 6 HOURS PRN
Qty: 15 TABLET | Refills: 0 | Status: SHIPPED | OUTPATIENT
Start: 2018-09-30 | End: 2018-11-08

## 2018-09-30 NOTE — DISCHARGE SUMMARY
[facility]  Discharge Summary    Helene Rizo Patient Status:  Inpatient    1985 MRN [id number]   [facility] 1SW-J Attending Felicitas Najjar, MD   Hosp Day # 3 PCP Preethi Chang MD         [facility]: Estimated Date of Delive

## 2018-09-30 NOTE — PROGRESS NOTES
NURSING DISCHARGE NOTE    Discharged Home via Ambulatory. Accompanied by Spouse  Belongings Taken by patient/family Verbalized good understanding of all D/C instructions. RX given for Norco per MD..

## 2018-10-04 ENCOUNTER — TELEPHONE (OUTPATIENT)
Dept: OBGYN UNIT | Facility: HOSPITAL | Age: 33
End: 2018-10-04

## 2018-11-08 ENCOUNTER — POSTPARTUM (OUTPATIENT)
Dept: OBGYN CLINIC | Facility: CLINIC | Age: 33
End: 2018-11-08
Payer: COMMERCIAL

## 2018-11-08 VITALS
WEIGHT: 141 LBS | RESPIRATION RATE: 12 BRPM | HEART RATE: 80 BPM | BODY MASS INDEX: 24.98 KG/M2 | SYSTOLIC BLOOD PRESSURE: 108 MMHG | DIASTOLIC BLOOD PRESSURE: 70 MMHG | TEMPERATURE: 99 F | HEIGHT: 63 IN

## 2018-11-08 DIAGNOSIS — Z98.891 STATUS POST CESAREAN DELIVERY: ICD-10-CM

## 2018-11-08 PROBLEM — Z34.90 PREGNANCY: Status: RESOLVED | Noted: 2018-07-25 | Resolved: 2018-11-08

## 2018-11-08 PROBLEM — O09.523 ELDERLY MULTIGRAVIDA IN THIRD TRIMESTER: Status: RESOLVED | Noted: 2018-09-19 | Resolved: 2018-11-08

## 2018-11-08 PROBLEM — Z34.81 PRENATAL CARE, SUBSEQUENT PREGNANCY IN FIRST TRIMESTER: Status: RESOLVED | Noted: 2018-03-21 | Resolved: 2018-11-08

## 2018-11-08 NOTE — PROGRESS NOTES
NYDIA    Manjinder Woodward is a 26year old female  here for 6 week post-partum visit. Patient delivered a  male infant on 2018 via repeat CSx. Patient desires Mirena IUD for contraception. Patient is breast feeding.    Patient denies symptom 98.7 °F (37.1 °C), temperature source Oral, resp. rate 12, height 63\", weight 141 lb, last menstrual period 01/16/2018, currently breastfeeding.   General:  Well nourished, well developed woman in no acute distress  Abdomen:  soft, nontender, no masses  Ex

## 2018-12-04 ENCOUNTER — OFFICE VISIT (OUTPATIENT)
Dept: OBGYN CLINIC | Facility: CLINIC | Age: 33
End: 2018-12-04
Payer: COMMERCIAL

## 2018-12-04 VITALS
SYSTOLIC BLOOD PRESSURE: 110 MMHG | RESPIRATION RATE: 12 BRPM | WEIGHT: 147 LBS | DIASTOLIC BLOOD PRESSURE: 64 MMHG | HEART RATE: 56 BPM | HEIGHT: 63 IN | BODY MASS INDEX: 26.05 KG/M2

## 2018-12-04 DIAGNOSIS — Z32.00 PREGNANCY EXAMINATION OR TEST, PREGNANCY UNCONFIRMED: ICD-10-CM

## 2018-12-04 DIAGNOSIS — Z30.430 ENCOUNTER FOR INSERTION OF INTRAUTERINE CONTRACEPTIVE DEVICE: Primary | ICD-10-CM

## 2018-12-04 PROCEDURE — 81025 URINE PREGNANCY TEST: CPT | Performed by: NURSE PRACTITIONER

## 2018-12-04 PROCEDURE — 58300 INSERT INTRAUTERINE DEVICE: CPT | Performed by: NURSE PRACTITIONER

## 2018-12-04 NOTE — PROGRESS NOTES
Patient is here for placement of Mirena IUD. I discussed with the patient risks, benefits, and alternatives of IUD placement.   We discussed risk of failure rate and pregnancy, including ectopic pregnancy, pelvic inflammatory disease and other side effec

## 2018-12-21 ENCOUNTER — TELEPHONE (OUTPATIENT)
Dept: OBGYN CLINIC | Facility: CLINIC | Age: 33
End: 2018-12-21

## 2018-12-21 NOTE — TELEPHONE ENCOUNTER
Pt reassured that ophthalmic steroids in small doses will be okay while breastfeeding. Once she knows the name of the medication she can always call back and we can \"double check\" but otherwise this is safe for her to use in the short term. Pt colleen.

## 2018-12-27 ENCOUNTER — OFFICE VISIT (OUTPATIENT)
Dept: OBGYN CLINIC | Facility: CLINIC | Age: 33
End: 2018-12-27
Payer: COMMERCIAL

## 2018-12-27 VITALS
RESPIRATION RATE: 14 BRPM | SYSTOLIC BLOOD PRESSURE: 102 MMHG | DIASTOLIC BLOOD PRESSURE: 68 MMHG | BODY MASS INDEX: 26 KG/M2 | WEIGHT: 145.63 LBS | TEMPERATURE: 98 F

## 2018-12-27 DIAGNOSIS — Z30.431 SURVEILLANCE OF (INTRAUTERINE) CONTRACEPTIVE DEVICE: Primary | ICD-10-CM

## 2018-12-27 PROBLEM — Z30.430 ENCOUNTER FOR INSERTION OF INTRAUTERINE CONTRACEPTIVE DEVICE: Status: RESOLVED | Noted: 2018-12-04 | Resolved: 2018-12-27

## 2018-12-27 PROCEDURE — 99212 OFFICE O/P EST SF 10 MIN: CPT | Performed by: NURSE PRACTITIONER

## 2018-12-27 NOTE — PROGRESS NOTES
Dwain Upton is a 26year old female. HPI:   Patient presents with: Follow - Up: IUD placement       Pt had IUD placed at beginning of Dec. 2018. Denies any pelvic pain, abn or foul smelling discharge.  Pt started having spotting 2 days ago and i 12/27/2018  Lei Parker APRN

## 2018-12-27 NOTE — PROGRESS NOTES
IUD placement 12-04-18. Pt is bleeding lightly which started two days ago. Pt states she has light cramping.

## 2022-03-04 ENCOUNTER — TELEPHONE (OUTPATIENT)
Dept: SCHEDULING | Age: 37
End: 2022-03-04

## 2022-03-10 ENCOUNTER — OFFICE VISIT (OUTPATIENT)
Dept: FAMILY MEDICINE | Age: 37
End: 2022-03-10

## 2022-03-10 ENCOUNTER — LAB SERVICES (OUTPATIENT)
Dept: LAB | Age: 37
End: 2022-03-10

## 2022-03-10 VITALS
BODY MASS INDEX: 24.63 KG/M2 | OXYGEN SATURATION: 100 % | TEMPERATURE: 98 F | SYSTOLIC BLOOD PRESSURE: 115 MMHG | DIASTOLIC BLOOD PRESSURE: 76 MMHG | HEART RATE: 67 BPM | HEIGHT: 63 IN | WEIGHT: 139 LBS

## 2022-03-10 DIAGNOSIS — Z01.818 PREOP EXAMINATION: ICD-10-CM

## 2022-03-10 DIAGNOSIS — Z01.818 PREOP EXAMINATION: Primary | ICD-10-CM

## 2022-03-10 LAB
ANION GAP SERPL CALC-SCNC: 12 MMOL/L (ref 10–20)
BASOPHILS # BLD: 0 K/MCL (ref 0–0.3)
BASOPHILS NFR BLD: 0 %
BUN SERPL-MCNC: 15 MG/DL (ref 6–20)
BUN/CREAT SERPL: 16 (ref 7–25)
CALCIUM SERPL-MCNC: 9.4 MG/DL (ref 8.4–10.2)
CHLORIDE SERPL-SCNC: 103 MMOL/L (ref 98–107)
CO2 SERPL-SCNC: 26 MMOL/L (ref 21–32)
CREAT SERPL-MCNC: 0.93 MG/DL (ref 0.51–0.95)
DEPRECATED RDW RBC: 42.2 FL (ref 39–50)
EOSINOPHIL # BLD: 0.1 K/MCL (ref 0–0.5)
EOSINOPHIL NFR BLD: 1 %
ERYTHROCYTE [DISTWIDTH] IN BLOOD: 12.9 % (ref 11–15)
FASTING DURATION TIME PATIENT: 10 HOURS (ref 0–999)
GFR SERPLBLD BASED ON 1.73 SQ M-ARVRAT: 78 ML/MIN
GLUCOSE SERPL-MCNC: 102 MG/DL (ref 70–99)
HCT VFR BLD CALC: 44.8 % (ref 36–46.5)
HGB BLD-MCNC: 15.2 G/DL (ref 12–15.5)
IMM GRANULOCYTES # BLD AUTO: 0 K/MCL (ref 0–0.2)
IMM GRANULOCYTES # BLD: 0 %
LYMPHOCYTES # BLD: 1.8 K/MCL (ref 1–4.8)
LYMPHOCYTES NFR BLD: 24 %
MCH RBC QN AUTO: 30 PG (ref 26–34)
MCHC RBC AUTO-ENTMCNC: 33.9 G/DL (ref 32–36.5)
MCV RBC AUTO: 88.5 FL (ref 78–100)
MONOCYTES # BLD: 0.7 K/MCL (ref 0.3–0.9)
MONOCYTES NFR BLD: 9 %
NEUTROPHILS # BLD: 4.9 K/MCL (ref 1.8–7.7)
NEUTROPHILS NFR BLD: 66 %
NRBC BLD MANUAL-RTO: 0 /100 WBC
PLATELET # BLD AUTO: 284 K/MCL (ref 140–450)
POTASSIUM SERPL-SCNC: 4.3 MMOL/L (ref 3.4–5.1)
RBC # BLD: 5.06 MIL/MCL (ref 4–5.2)
SODIUM SERPL-SCNC: 137 MMOL/L (ref 135–145)
WBC # BLD: 7.5 K/MCL (ref 4.2–11)

## 2022-03-10 PROCEDURE — 99204 OFFICE O/P NEW MOD 45 MIN: CPT | Performed by: FAMILY MEDICINE

## 2022-03-10 PROCEDURE — 85730 THROMBOPLASTIN TIME PARTIAL: CPT | Performed by: PSYCHIATRY & NEUROLOGY

## 2022-03-10 PROCEDURE — 36415 COLL VENOUS BLD VENIPUNCTURE: CPT | Performed by: PSYCHIATRY & NEUROLOGY

## 2022-03-10 PROCEDURE — 85025 COMPLETE CBC W/AUTO DIFF WBC: CPT | Performed by: PSYCHIATRY & NEUROLOGY

## 2022-03-10 PROCEDURE — 80048 BASIC METABOLIC PNL TOTAL CA: CPT | Performed by: PSYCHIATRY & NEUROLOGY

## 2022-03-10 PROCEDURE — 85610 PROTHROMBIN TIME: CPT | Performed by: PSYCHIATRY & NEUROLOGY

## 2022-03-10 ASSESSMENT — ENCOUNTER SYMPTOMS
CHILLS: 0
FOCAL WEAKNESS: 0
SORE THROAT: 0
SHORTNESS OF BREATH: 0
HEARTBURN: 0
HEADACHES: 0
BLOOD IN STOOL: 0
DIZZINESS: 0
WEIGHT LOSS: 0
FEVER: 0
WHEEZING: 0
COUGH: 0
ABDOMINAL PAIN: 0
BRUISES/BLEEDS EASILY: 0
BLURRED VISION: 0
DEPRESSION: 0

## 2022-03-10 ASSESSMENT — PATIENT HEALTH QUESTIONNAIRE - PHQ9
CLINICAL INTERPRETATION OF PHQ2 SCORE: NO FURTHER SCREENING NEEDED
2. FEELING DOWN, DEPRESSED OR HOPELESS: NOT AT ALL
1. LITTLE INTEREST OR PLEASURE IN DOING THINGS: NOT AT ALL
SUM OF ALL RESPONSES TO PHQ9 QUESTIONS 1 AND 2: 0
SUM OF ALL RESPONSES TO PHQ9 QUESTIONS 1 AND 2: 0

## 2022-03-11 LAB
APTT PPP: 31 SEC (ref 22–30)
INR PPP: 1.1
PROTHROMBIN TIME: 11.4 SEC (ref 9.7–11.8)

## 2022-04-14 RX ORDER — ASCORBIC ACID 500 MG
500 TABLET ORAL DAILY
COMMUNITY

## 2022-04-14 RX ORDER — BIOTIN 1 MG
1 TABLET ORAL DAILY
Status: ON HOLD | COMMUNITY
End: 2022-04-19 | Stop reason: ALTCHOICE

## 2022-04-19 ENCOUNTER — HOSPITAL ENCOUNTER (OUTPATIENT)
Facility: HOSPITAL | Age: 37
Discharge: HOME | End: 2022-04-20
Attending: SPECIALIST | Admitting: SPECIALIST

## 2022-04-19 ENCOUNTER — ANESTHESIA EVENT (OUTPATIENT)
Dept: SURGERY | Facility: HOSPITAL | Age: 37
End: 2022-04-19

## 2022-04-19 ENCOUNTER — ANESTHESIA (OUTPATIENT)
Dept: SURGERY | Facility: HOSPITAL | Age: 37
End: 2022-04-19

## 2022-04-19 DIAGNOSIS — Z01.818 PREOP TESTING: Primary | ICD-10-CM

## 2022-04-19 PROBLEM — M62.08 RECTUS DIASTASIS: Status: ACTIVE | Noted: 2022-04-19

## 2022-04-19 LAB — B-HCG UR QL: NEGATIVE

## 2022-04-19 PROCEDURE — 0J0M3ZZ ALTERATION OF LEFT UPPER LEG SUBCUTANEOUS TISSUE AND FASCIA, PERCUTANEOUS APPROACH: ICD-10-PCS | Performed by: SPECIALIST

## 2022-04-19 PROCEDURE — 81025 URINE PREGNANCY TEST: CPT

## 2022-04-19 PROCEDURE — 0W0F3ZZ ALTERATION OF ABDOMINAL WALL, PERCUTANEOUS APPROACH: ICD-10-PCS | Performed by: SPECIALIST

## 2022-04-19 PROCEDURE — 0J080ZZ ALTERATION OF ABDOMEN SUBCUTANEOUS TISSUE AND FASCIA, OPEN APPROACH: ICD-10-PCS | Performed by: SPECIALIST

## 2022-04-19 PROCEDURE — 0J0L3ZZ ALTERATION OF RIGHT UPPER LEG SUBCUTANEOUS TISSUE AND FASCIA, PERCUTANEOUS APPROACH: ICD-10-PCS | Performed by: SPECIALIST

## 2022-04-19 RX ORDER — ONDANSETRON 2 MG/ML
8 INJECTION INTRAMUSCULAR; INTRAVENOUS EVERY 4 HOURS PRN
Status: DISCONTINUED | OUTPATIENT
Start: 2022-04-19 | End: 2022-04-20

## 2022-04-19 RX ORDER — GLYCOPYRROLATE 0.2 MG/ML
INJECTION, SOLUTION INTRAMUSCULAR; INTRAVENOUS AS NEEDED
Status: DISCONTINUED | OUTPATIENT
Start: 2022-04-19 | End: 2022-04-19 | Stop reason: SURG

## 2022-04-19 RX ORDER — MIDAZOLAM HYDROCHLORIDE 1 MG/ML
INJECTION INTRAMUSCULAR; INTRAVENOUS AS NEEDED
Status: DISCONTINUED | OUTPATIENT
Start: 2022-04-19 | End: 2022-04-19 | Stop reason: SURG

## 2022-04-19 RX ORDER — MORPHINE SULFATE 2 MG/ML
1 INJECTION, SOLUTION INTRAMUSCULAR; INTRAVENOUS EVERY 2 HOUR PRN
Status: DISCONTINUED | OUTPATIENT
Start: 2022-04-19 | End: 2022-04-20

## 2022-04-19 RX ORDER — CEFAZOLIN SODIUM/WATER 2 G/20 ML
2 SYRINGE (ML) INTRAVENOUS ONCE
Status: COMPLETED | OUTPATIENT
Start: 2022-04-19 | End: 2022-04-19

## 2022-04-19 RX ORDER — HYDROCODONE BITARTRATE AND ACETAMINOPHEN 10; 325 MG/1; MG/1
1 TABLET ORAL EVERY 4 HOURS PRN
Status: DISCONTINUED | OUTPATIENT
Start: 2022-04-19 | End: 2022-04-20

## 2022-04-19 RX ORDER — ONDANSETRON 2 MG/ML
4 INJECTION INTRAMUSCULAR; INTRAVENOUS ONCE AS NEEDED
Status: DISCONTINUED | OUTPATIENT
Start: 2022-04-19 | End: 2022-04-19 | Stop reason: HOSPADM

## 2022-04-19 RX ORDER — DEXTROSE, SODIUM CHLORIDE, SODIUM LACTATE, POTASSIUM CHLORIDE, AND CALCIUM CHLORIDE 5; .6; .31; .03; .02 G/100ML; G/100ML; G/100ML; G/100ML; G/100ML
INJECTION, SOLUTION INTRAVENOUS CONTINUOUS
Status: DISCONTINUED | OUTPATIENT
Start: 2022-04-19 | End: 2022-04-20

## 2022-04-19 RX ORDER — HALOPERIDOL 5 MG/ML
0.25 INJECTION INTRAMUSCULAR ONCE AS NEEDED
Status: DISCONTINUED | OUTPATIENT
Start: 2022-04-19 | End: 2022-04-19 | Stop reason: HOSPADM

## 2022-04-19 RX ORDER — DEXAMETHASONE SODIUM PHOSPHATE 4 MG/ML
VIAL (ML) INJECTION AS NEEDED
Status: DISCONTINUED | OUTPATIENT
Start: 2022-04-19 | End: 2022-04-19 | Stop reason: SURG

## 2022-04-19 RX ORDER — DIAPER,BRIEF,INFANT-TODD,DISP
EACH MISCELLANEOUS AS NEEDED
Status: DISCONTINUED | OUTPATIENT
Start: 2022-04-19 | End: 2022-04-19 | Stop reason: HOSPADM

## 2022-04-19 RX ORDER — SODIUM CHLORIDE 0.9 % (FLUSH) 0.9 %
10 SYRINGE (ML) INJECTION AS NEEDED
Status: DISCONTINUED | OUTPATIENT
Start: 2022-04-19 | End: 2022-04-20

## 2022-04-19 RX ORDER — PROCHLORPERAZINE EDISYLATE 5 MG/ML
10 INJECTION INTRAMUSCULAR; INTRAVENOUS EVERY 6 HOURS PRN
Status: DISCONTINUED | OUTPATIENT
Start: 2022-04-19 | End: 2022-04-20

## 2022-04-19 RX ORDER — HYDROMORPHONE HYDROCHLORIDE 1 MG/ML
0.2 INJECTION, SOLUTION INTRAMUSCULAR; INTRAVENOUS; SUBCUTANEOUS EVERY 5 MIN PRN
Status: DISCONTINUED | OUTPATIENT
Start: 2022-04-19 | End: 2022-04-19 | Stop reason: HOSPADM

## 2022-04-19 RX ORDER — ACETAMINOPHEN 500 MG
1000 TABLET ORAL ONCE
Status: COMPLETED | OUTPATIENT
Start: 2022-04-19 | End: 2022-04-19

## 2022-04-19 RX ORDER — MORPHINE SULFATE 4 MG/ML
4 INJECTION, SOLUTION INTRAMUSCULAR; INTRAVENOUS EVERY 10 MIN PRN
Status: DISCONTINUED | OUTPATIENT
Start: 2022-04-19 | End: 2022-04-19 | Stop reason: HOSPADM

## 2022-04-19 RX ORDER — PROCHLORPERAZINE EDISYLATE 5 MG/ML
5 INJECTION INTRAMUSCULAR; INTRAVENOUS ONCE AS NEEDED
Status: DISCONTINUED | OUTPATIENT
Start: 2022-04-19 | End: 2022-04-19 | Stop reason: HOSPADM

## 2022-04-19 RX ORDER — BUPIVACAINE HYDROCHLORIDE 2.5 MG/ML
INJECTION, SOLUTION EPIDURAL; INFILTRATION; INTRACAUDAL AS NEEDED
Status: DISCONTINUED | OUTPATIENT
Start: 2022-04-19 | End: 2022-04-19 | Stop reason: HOSPADM

## 2022-04-19 RX ORDER — ROCURONIUM BROMIDE 10 MG/ML
INJECTION, SOLUTION INTRAVENOUS AS NEEDED
Status: DISCONTINUED | OUTPATIENT
Start: 2022-04-19 | End: 2022-04-19 | Stop reason: SURG

## 2022-04-19 RX ORDER — NALOXONE HYDROCHLORIDE 0.4 MG/ML
80 INJECTION, SOLUTION INTRAMUSCULAR; INTRAVENOUS; SUBCUTANEOUS AS NEEDED
Status: DISCONTINUED | OUTPATIENT
Start: 2022-04-19 | End: 2022-04-19 | Stop reason: HOSPADM

## 2022-04-19 RX ORDER — MORPHINE SULFATE 10 MG/ML
6 INJECTION, SOLUTION INTRAMUSCULAR; INTRAVENOUS EVERY 10 MIN PRN
Status: DISCONTINUED | OUTPATIENT
Start: 2022-04-19 | End: 2022-04-19 | Stop reason: HOSPADM

## 2022-04-19 RX ORDER — SODIUM CHLORIDE, SODIUM LACTATE, POTASSIUM CHLORIDE, CALCIUM CHLORIDE 600; 310; 30; 20 MG/100ML; MG/100ML; MG/100ML; MG/100ML
INJECTION, SOLUTION INTRAVENOUS CONTINUOUS
Status: DISCONTINUED | OUTPATIENT
Start: 2022-04-19 | End: 2022-04-19

## 2022-04-19 RX ORDER — LIDOCAINE HYDROCHLORIDE 10 MG/ML
INJECTION, SOLUTION EPIDURAL; INFILTRATION; INTRACAUDAL; PERINEURAL AS NEEDED
Status: DISCONTINUED | OUTPATIENT
Start: 2022-04-19 | End: 2022-04-19 | Stop reason: SURG

## 2022-04-19 RX ORDER — HYDROCODONE BITARTRATE AND ACETAMINOPHEN 5; 325 MG/1; MG/1
1 TABLET ORAL EVERY 4 HOURS PRN
Status: DISCONTINUED | OUTPATIENT
Start: 2022-04-19 | End: 2022-04-20

## 2022-04-19 RX ORDER — SODIUM CHLORIDE, SODIUM LACTATE, POTASSIUM CHLORIDE, CALCIUM CHLORIDE 600; 310; 30; 20 MG/100ML; MG/100ML; MG/100ML; MG/100ML
INJECTION, SOLUTION INTRAVENOUS CONTINUOUS
Status: DISCONTINUED | OUTPATIENT
Start: 2022-04-19 | End: 2022-04-19 | Stop reason: HOSPADM

## 2022-04-19 RX ORDER — HYDROMORPHONE HYDROCHLORIDE 1 MG/ML
0.4 INJECTION, SOLUTION INTRAMUSCULAR; INTRAVENOUS; SUBCUTANEOUS EVERY 5 MIN PRN
Status: DISCONTINUED | OUTPATIENT
Start: 2022-04-19 | End: 2022-04-19 | Stop reason: HOSPADM

## 2022-04-19 RX ORDER — CEFAZOLIN SODIUM/WATER 2 G/20 ML
2 SYRINGE (ML) INTRAVENOUS EVERY 8 HOURS
Status: COMPLETED | OUTPATIENT
Start: 2022-04-19 | End: 2022-04-20

## 2022-04-19 RX ORDER — NEOSTIGMINE METHYLSULFATE 1 MG/ML
INJECTION INTRAVENOUS AS NEEDED
Status: DISCONTINUED | OUTPATIENT
Start: 2022-04-19 | End: 2022-04-19 | Stop reason: SURG

## 2022-04-19 RX ORDER — ACETAMINOPHEN 325 MG/1
650 TABLET ORAL EVERY 6 HOURS PRN
Status: DISCONTINUED | OUTPATIENT
Start: 2022-04-19 | End: 2022-04-20

## 2022-04-19 RX ORDER — TEMAZEPAM 15 MG/1
15 CAPSULE ORAL NIGHTLY PRN
Status: DISCONTINUED | OUTPATIENT
Start: 2022-04-19 | End: 2022-04-20

## 2022-04-19 RX ORDER — MORPHINE SULFATE 4 MG/ML
2 INJECTION, SOLUTION INTRAMUSCULAR; INTRAVENOUS EVERY 10 MIN PRN
Status: DISCONTINUED | OUTPATIENT
Start: 2022-04-19 | End: 2022-04-19 | Stop reason: HOSPADM

## 2022-04-19 RX ORDER — METHYLENE BLUE 10 MG/ML
INJECTION INTRAVENOUS AS NEEDED
Status: DISCONTINUED | OUTPATIENT
Start: 2022-04-19 | End: 2022-04-19 | Stop reason: HOSPADM

## 2022-04-19 RX ORDER — HYDROCODONE BITARTRATE AND ACETAMINOPHEN 5; 325 MG/1; MG/1
1 TABLET ORAL AS NEEDED
Status: DISCONTINUED | OUTPATIENT
Start: 2022-04-19 | End: 2022-04-19 | Stop reason: HOSPADM

## 2022-04-19 RX ORDER — HYDROMORPHONE HYDROCHLORIDE 1 MG/ML
0.6 INJECTION, SOLUTION INTRAMUSCULAR; INTRAVENOUS; SUBCUTANEOUS EVERY 5 MIN PRN
Status: DISCONTINUED | OUTPATIENT
Start: 2022-04-19 | End: 2022-04-19 | Stop reason: HOSPADM

## 2022-04-19 RX ORDER — MORPHINE SULFATE 2 MG/ML
2 INJECTION, SOLUTION INTRAMUSCULAR; INTRAVENOUS EVERY 2 HOUR PRN
Status: DISCONTINUED | OUTPATIENT
Start: 2022-04-19 | End: 2022-04-20

## 2022-04-19 RX ORDER — ONDANSETRON 2 MG/ML
INJECTION INTRAMUSCULAR; INTRAVENOUS AS NEEDED
Status: DISCONTINUED | OUTPATIENT
Start: 2022-04-19 | End: 2022-04-19 | Stop reason: SURG

## 2022-04-19 RX ORDER — HYDROCODONE BITARTRATE AND ACETAMINOPHEN 5; 325 MG/1; MG/1
2 TABLET ORAL AS NEEDED
Status: DISCONTINUED | OUTPATIENT
Start: 2022-04-19 | End: 2022-04-19 | Stop reason: HOSPADM

## 2022-04-19 RX ADMIN — ROCURONIUM BROMIDE 50 MG: 10 INJECTION, SOLUTION INTRAVENOUS at 10:43:00

## 2022-04-19 RX ADMIN — GLYCOPYRROLATE 0.8 MG: 0.2 INJECTION, SOLUTION INTRAMUSCULAR; INTRAVENOUS at 12:14:00

## 2022-04-19 RX ADMIN — SODIUM CHLORIDE, SODIUM LACTATE, POTASSIUM CHLORIDE, CALCIUM CHLORIDE: 600; 310; 30; 20 INJECTION, SOLUTION INTRAVENOUS at 13:06:00

## 2022-04-19 RX ADMIN — LIDOCAINE HYDROCHLORIDE 50 MG: 10 INJECTION, SOLUTION EPIDURAL; INFILTRATION; INTRACAUDAL; PERINEURAL at 10:43:00

## 2022-04-19 RX ADMIN — DEXAMETHASONE SODIUM PHOSPHATE 4 MG: 4 MG/ML VIAL (ML) INJECTION at 11:00:00

## 2022-04-19 RX ADMIN — ROCURONIUM BROMIDE 10 MG: 10 INJECTION, SOLUTION INTRAVENOUS at 11:43:00

## 2022-04-19 RX ADMIN — CEFAZOLIN SODIUM/WATER 2 G: 2 G/20 ML SYRINGE (ML) INTRAVENOUS at 10:49:00

## 2022-04-19 RX ADMIN — ONDANSETRON 4 MG: 2 INJECTION INTRAMUSCULAR; INTRAVENOUS at 12:10:00

## 2022-04-19 RX ADMIN — ROCURONIUM BROMIDE 10 MG: 10 INJECTION, SOLUTION INTRAVENOUS at 10:45:00

## 2022-04-19 RX ADMIN — MIDAZOLAM HYDROCHLORIDE 2 MG: 1 INJECTION INTRAMUSCULAR; INTRAVENOUS at 10:41:00

## 2022-04-19 RX ADMIN — NEOSTIGMINE METHYLSULFATE 4 MG: 1 INJECTION INTRAVENOUS at 12:14:00

## 2022-04-19 NOTE — DISCHARGE SUMMARY
[facility]    Discharge Summary    Fransico Kimble Patient Status:  Outpatient in a Bed    1985 MRN [id number]   Location 185 [facility] Attending Liss Mckeon MD    Day # 0 PCP Alda Hollis MD     Date of Admission: 2022   Date of Discharge: 2022  Admitting Diagnosis: rectus diastasis and excess skin/fat of abdomen, hips and mons pubis    Disposition: Discharge to home    Discharge Diagnosis: rectus diastasis and excess skin/fat of abdomen, hips and mons pubis     Hospital Course:   Reason for Admission: excess skin/fat of abdomen, hips and mons pubis      Surgical Procedures     Case IDs Date Procedure Surgeon Location Status    [id number] 22 abdominoplasty with liposuction of hips and mons pubis with brian drain and pain pump placement Liss Mckeon MD [facility] Course: Uncomplicated    Complications: None        Discharge Plan:   Discharge Condition:Good    Current Discharge Medication List    Home Meds - Unchanged    Vitamin C 500 MG Oral Tab  Take 500 mg by mouth daily. Discharge Diet: Low sodium diet (1500mg/day)    Discharge Activity: As tolerated    Follow up: Follow-up Information     Liss Mckeon MD In 1 day. Specialty: SURGERY, PLASTIC  Why: Pain pump removal  Contact information:  [facility]  [phone]                             Other Discharge Instructions:       BRIAN drain care as instructed - empty, record and strip BRIAN drains as needed to keep a kink in the BRIAN bulb. Please reinforce BRIAN drain sites with 4x4 gauze as needed if there is drainage from under the clear plastic adhesive dressing. Do not remove any plastic adhesive dressings covering BRIAN drain skin sites or pain pump sites. May remove garment briefly if needed, then replace promptly.   Begin antibiotics tonight as instructed; Pain medication as needed for pain.  Ambulate and deep breathe regularly. Hold on shower until Jeff drains removed. Return to clinic as scheduled on Thursday 4/21/22.         Marla Kimble MD  4/19/2022

## 2022-04-19 NOTE — BRIEF OP NOTE
Pre-Operative Diagnosis: excess skin/fat of abdomen, hips and mons pubis     Post-Operative Diagnosis: excess skin/fat of abdomen, hips and mons pubis      Procedure Performed:   abdominoplasty with liposuction of hips and mons pubis with brian drain and pain pump placement    Surgeon(s) and Role:     Brianne Brink MD - Primary    Assistant(s):  Surgical Assistant.: Lee Gomez     Surgical Findings: Nothing unusual     Specimen: none     Estimated Blood Loss: 50cc    Dictation Number:  Dictated    Marla Kimble MD  4/19/2022  1:05 PM

## 2022-04-19 NOTE — ANESTHESIA PROCEDURE NOTES
Airway  Date/Time: 4/19/2022 10:46 AM  Urgency: Elective    Airway not difficult    General Information and Staff    Patient location during procedure: OR  Anesthesiologist: Bela Felton MD  Resident/CRNA: Kb Latham CRNA  Performed: CRNA and anesthesiologist     Indications and Patient Condition  Indications for airway management: anesthesia  Sedation level: deep  Preoxygenated: yes  Patient position: sniffing  Mask difficulty assessment: 1 - vent by mask    Final Airway Details  Final airway type: endotracheal airway      Successful airway: ETT  Cuffed: yes   Successful intubation technique: direct laryngoscopy  Endotracheal tube insertion site: oral  Blade: Josias  Blade size: #3  ETT size (mm): 7.0    Cormack-Lehane Classification: grade I - full view of glottis  Placement verified by: chest auscultation and capnometry   Cuff volume (mL): 8  Measured from: lips  ETT to lips (cm): 20  Number of attempts at approach: 1  Number of other approaches attempted: 0

## 2022-04-20 VITALS
TEMPERATURE: 98 F | OXYGEN SATURATION: 100 % | DIASTOLIC BLOOD PRESSURE: 84 MMHG | SYSTOLIC BLOOD PRESSURE: 120 MMHG | HEART RATE: 61 BPM | WEIGHT: 134 LBS | HEIGHT: 63 IN | BODY MASS INDEX: 23.74 KG/M2 | RESPIRATION RATE: 18 BRPM

## 2022-04-20 PROBLEM — M62.08 RECTUS DIASTASIS: Status: RESOLVED | Noted: 2022-04-19 | Resolved: 2022-04-20

## 2022-04-20 NOTE — OPERATIVE REPORT
[facility]    PATIENT'S NAME: Linsey Dong   ATTENDING PHYSICIAN: Nicholas Fatima MD   OPERATING PHYSICIAN: Nicholas Fatima MD   PATIENT ACCOUNT#:   [id number]    LOCATION:   ROOM 1 A [facility]  MEDICAL RECORD #:   [id number]       YOB: 1985  ADMISSION DATE:       04/19/2022      OPERATION DATE:  04/19/2022    OPERATIVE REPORT      PREOPERATIVE DIAGNOSIS:  Rectus diastasis with excess skin and fatty tissue of the anterior abdominal wall. POSTOPERATIVE DIAGNOSIS:  Rectus diastasis with excess skin and fatty tissue of the anterior abdominal wall. PROCEDURE:  Full abdominoplasty with liposuction of the hips and mons pubis. ASSISTANT:  Edward Wood CSA     ANESTHESIA:  General endotracheal anesthesia. ESTIMATED BLOOD LOSS:  50 mL. INTRAVENOUS FLUIDS:  2000 mL of crystalloid. COMPLICATIONS:  None. DRAINS PLACED:  Two 7 mm flat LOREN drains in the lower abdominal area and 2 On-Q pain pump catheters in the abdominal area for postoperative pain control. DISPOSITION:  Patient tolerated the procedure well, was awakened from anesthesia, and transferred to the recovery room in good condition. INDICATIONS:  This is a 47-year-old patient who presented to my office with complaints of rectus diastasis with excess skin and fatty tissue of the anterior abdominal wall, the hips, and mons pubis. She requested a full abdominoplasty with liposuction of the hips and the mons pubis. We discussed the operation in detail at 2 separate preoperative visits.   She was made aware of the risks of bleeding; infection; scarring; asymmetry; contour irregularities; wound dehiscence with the need for secondary wound healing; skin flap necrosis with the need for secondary wound healing; umbilical necrosis with the need for secondary wound healing; skin flap sensory loss that can be permanent in nature; umbilical sensory loss that can be permanent in nature; overall dissatisfaction with the final aesthetic outcome; the need for revisional surgery; the use of LOREN drains and pain pumps with the operation; and the rare but possible risk of DVT, PE, MI, COVID-19 infection, damage to deeper structures such as nerves, arteries, veins and intra-abdominal structures, and death. Understanding these risks and limitations, she wished to proceed. I answered all her questions in detail at the 2 separate preoperative visits and again in the preoperative holding area. Once all questions were answered and consents obtained, the procedure went as follows. OPERATIVE TECHNIQUE:  The patient was taken to the operating room, placed on the table in a supine position. General endotracheal anesthesia was induced without any complications. The anterior abdominal area was prepped and draped in normal sterile fashion. At the beginning of the procedure, approximately 400 mL of tumescent solution was infiltrated throughout the hips and the mons pubis. At this point, the 3.0 mm liposuction cannula was used to lipo aspirate a total of 25 mL from the bilateral hips and from the mons pubis area. At this point, the lower abdominal incision line that had been premarked in the holding area was made with a 10 blade. Dissection was carried down through the skin and subcutaneous tissue until the underlying rectus fascia was identified. Dissection was carried on the anterior surface of the rectus fascia at the level of the umbilicus. The umbilicus was  from the anterior abdominal wall skin flap and left adherent to the anterior abdominal wall with a healthy stalk of vascularized tissue. At this point, dissection was carried further superiorly to the level of the xiphoid process. Dissection cavity was irrigated multiple times with warm antibiotic irrigation, as well as with Betadine. Bipolar was used for hemostasis throughout.   The rectus diastasis was marked and then imbricated using 3 layers of looped 0 nylon suture, first in the supraumbilical and then infraumbilical area. Once the abdominal wall was tautly reconstructed, it was once again irrigated multiple times with warm antibiotic irrigation as well with Betadine, and bipolar was again used for hemostasis throughout. Stab incisions were made in the groin for placement of two 7 mm flat LOREN drains, which were secured with 3-0 nylon sutures, and then On-Q pain pump catheters were placed into the abdominal cavity through small stab incisions along the lower abdominal incision line. The tips of the catheter were loosely attached to the most-superior aspect of the rectus diastasis repair with a single loop of Vicryl suture. At this point, the bed was flexed. The abdominal skin flap was brought down over the reconstructed abdominal wall and towel clipped centrally, and then the distal aspect of the skin flap was removed sharply. There was bright red bleeding at the distal aspect of the skin flap. Bipolar was used for hemostasis along the fresh incisional edge, and then closure was done in a multilayer fashion with 2-0 Vicryl sutures in Román fascia, 2-0 Vicryl sutures in the deeper dermis, and a running intradermal 4-0 plain was used to reapproximate the superficial dermis. At the level of the umbilicus, a vertically-oriented elliptical incision was made. The skin and underlying subcutaneous tissue was removed sharply. Bipolar was used for hemostasis along the fresh incisional edge, and then the original umbilicus, which was pink and viable with no evidence of venous congestion or ischemia, was brought out and sewn in place with 3-0 Vicryl and 4-0 Prolene. Mastisol and Steri-Strips were applied along the incision line. Mastisol, Steri-Strips, Biopatches, and Tegaderm were used to secure the LOREN drain and pain pumps. Bacitracin, a 2 x 2, Mastisol, and Tegaderm were placed over the umbilicus.   The patient was placed into a compression binder, awakened from anesthesia, and transferred to the recovery room in good condition. She will be admitted for an overnight 23-hour observation. Dictated By Rashi Fatima MD  d: 04/19/2022 13:02:12  t: 04/19/2022 19:40:19  [facility] [id number]  FGD/

## (undated) DEVICE — MAJOR GENERAL: Brand: MEDLINE INDUSTRIES, INC.

## (undated) DEVICE — PROXIMATE SKIN STAPLERS (35 WIDE) CONTAINS 35 STAINLESS STEEL STAPLES (FIXED HEAD): Brand: PROXIMATE

## (undated) DEVICE — SUTURE ETH 0 CT 48IN MFL LOOP

## (undated) DEVICE — SUTURE VICRYL 3-0 J497G

## (undated) DEVICE — CLIPPER BLADE 3M

## (undated) DEVICE — SYSTEM PAIN PMP 4ML/HR ONQ

## (undated) DEVICE — SUCTION CANISTER, 3000CC,SAFELINER: Brand: DEROYAL

## (undated) DEVICE — 3M™ BAIR HUGGER® UNDERBODY BLANKET, FULL ACCESS, 10 PER CASE 63500: Brand: BAIR HUGGER™

## (undated) DEVICE — 3M™ STERI-DRAPE™ INSTRUMENT POUCH 1018: Brand: STERI-DRAPE™

## (undated) DEVICE — VIOLET BRAIDED (POLYGLACTIN 910), SYNTHETIC ABSORBABLE SUTURE: Brand: COATED VICRYL

## (undated) DEVICE — TOWEL SURG OR 17X30IN BLUE

## (undated) DEVICE — DRAIN INCS 7MM 20CMX7MM SIL

## (undated) DEVICE — TRAY SKIN PREP PVP-1

## (undated) DEVICE — DRESSING BIOPATCH 1X4 CNTR

## (undated) DEVICE — SUTURE PROLENE 4-0 FS-2

## (undated) DEVICE — ADHESIVE MASTISOL 2/3CC VL

## (undated) DEVICE — CASED DISP BIPOLAR CORD

## (undated) DEVICE — GAUZE SPONGES,12 PLY: Brand: CURITY

## (undated) DEVICE — SUTURE ETHILON 3-0 669H

## (undated) DEVICE — GAMMEX® PI HYBRID SIZE 6.5, STERILE POWDER-FREE SURGICAL GLOVE, POLYISOPRENE AND NEOPRENE BLEND: Brand: GAMMEX

## (undated) DEVICE — SUTURE VICRYL 2-0 FS-1

## (undated) DEVICE — DRAIN RESERVOIR RELIAVAC 100CC

## (undated) DEVICE — BANDAGE ROLL,100% COTTON, 6 PLY, LARGE: Brand: KERLIX

## (undated) DEVICE — 3M™ STERI-STRIP™ REINFORCED ADHESIVE SKIN CLOSURES, R1547, 1/2 IN X 4 IN (12 MM X 100 MM), 6 STRIPS/ENVELOPE: Brand: 3M™ STERI-STRIP™

## (undated) DEVICE — SOL  .9 1000ML BTL

## (undated) DEVICE — APPLICATOR COTTON TIP 6" 2/PK

## (undated) DEVICE — Device

## (undated) NOTE — LETTER
[facility]  Connie Whitt 59 [address] [facility], [address] [place] [address]    Consent for Operation    Date: __________________    Time: _______________    1.  I authorize the performance upon [employer] the following operation:                              Betty Devlin videotape. [facility] will not be responsible for storage or maintenance of this tape. 6. For the purpose of advancing medical education, I consent to the admittance of observers to the Operating Room.     7. I authorize the use of any specimen, organs Signature of Patient:   ___________________________    When the patient is a minor or mentally incompetent to give consent:  Signature of person authorized to consent for patient: ___________________________   Relationship to patient: _____________________ 3. I understand how the anesthesia medicine will help me (benefits). 4. I understand that with any type of anesthesia medicine there are risks:  a.  The most common risks are: nausea, vomiting, sore throat, muscle soreness, damage to my eyes, mouth, or t _____________________________________________________________________________  Witness        Date   Time  I have verified that the signature is that of the patient or patient’s representative, and that it was signed before the procedure    Page 2 of 2

## (undated) NOTE — LETTER
Марина Worley, :1985    CONSENT FOR PROCEDURE/SEDATION    1. I authorize the performance upon [employer]  the following: Intrauterine Device Mirena    2.  I authorize ILYA Victoria (and whomever is designated as the docto Relationship to patient: ____________________________________________    Witness: _________________________________________ Date:___________     Physician Signature: _______________________________ Date:___________